# Patient Record
Sex: MALE | Race: WHITE
[De-identification: names, ages, dates, MRNs, and addresses within clinical notes are randomized per-mention and may not be internally consistent; named-entity substitution may affect disease eponyms.]

---

## 2019-08-05 ENCOUNTER — HOSPITAL ENCOUNTER (OUTPATIENT)
Dept: HOSPITAL 92 - CT | Age: 64
Discharge: HOME | End: 2019-08-05
Attending: FAMILY MEDICINE
Payer: COMMERCIAL

## 2019-08-05 DIAGNOSIS — F17.210: Primary | ICD-10-CM

## 2019-08-05 PROCEDURE — G0297 LDCT FOR LUNG CA SCREEN: HCPCS

## 2019-08-05 NOTE — CT
CT chest noncontrast low-dose screening.



HISTORY: 30 year history of smoking.. Currently smoking.



FINDINGS: Lungs are hyperinflated with scattered mild bullae. Mild peripheral interstitial scarring. 
Scattered tiny pleural-based densities. Below the threshold for measurable nodule. No pleural fluid

or pneumothorax.



Lack of contrast limits evaluation of the soft tissues. No bulky mediastinal adenopathy.







IMPRESSION: Lung RADS category 1. Negative. Suggest routine screening.



Emphysema.



Reported By: MILIND Durant 

Electronically Signed:  8/5/2019 4:11 PM